# Patient Record
Sex: FEMALE | Race: WHITE | NOT HISPANIC OR LATINO | Employment: FULL TIME | URBAN - METROPOLITAN AREA
[De-identification: names, ages, dates, MRNs, and addresses within clinical notes are randomized per-mention and may not be internally consistent; named-entity substitution may affect disease eponyms.]

---

## 2017-10-07 ENCOUNTER — HOSPITAL ENCOUNTER (EMERGENCY)
Facility: HOSPITAL | Age: 32
Discharge: HOME | End: 2017-10-08
Attending: EMERGENCY MEDICINE | Admitting: EMERGENCY MEDICINE

## 2017-10-07 VITALS
HEART RATE: 98 BPM | SYSTOLIC BLOOD PRESSURE: 137 MMHG | OXYGEN SATURATION: 98 % | HEIGHT: 68 IN | TEMPERATURE: 98.1 F | BODY MASS INDEX: 36.37 KG/M2 | RESPIRATION RATE: 20 BRPM | WEIGHT: 240 LBS | DIASTOLIC BLOOD PRESSURE: 78 MMHG

## 2017-10-07 DIAGNOSIS — A57: Primary | ICD-10-CM

## 2017-10-07 LAB
B-HCG UR QL: NEGATIVE
INTERNAL NEGATIVE CONTROL: NEGATIVE
INTERNAL POSITIVE CONTROL: POSITIVE
Lab: NORMAL

## 2017-10-07 PROCEDURE — 96372 THER/PROPH/DIAG INJ SC/IM: CPT

## 2017-10-07 PROCEDURE — 81001 URINALYSIS AUTO W/SCOPE: CPT | Performed by: NURSE PRACTITIONER

## 2017-10-07 PROCEDURE — 99283 EMERGENCY DEPT VISIT LOW MDM: CPT

## 2017-10-07 PROCEDURE — 87086 URINE CULTURE/COLONY COUNT: CPT | Performed by: NURSE PRACTITIONER

## 2017-10-08 LAB
BACTERIA UR QL AUTO: ABNORMAL /HPF
BILIRUB UR QL STRIP: NEGATIVE
CLARITY UR: CLEAR
CLUE CELLS SPEC QL WET PREP: NORMAL
COLOR UR: YELLOW
GLUCOSE UR STRIP-MCNC: NEGATIVE MG/DL
HGB UR QL STRIP.AUTO: ABNORMAL
HYALINE CASTS UR QL AUTO: ABNORMAL /LPF
HYDATID CYST SPEC WET PREP: NORMAL
KETONES UR QL STRIP: NEGATIVE
KOH PREP NAIL: NORMAL
LEUKOCYTE ESTERASE UR QL STRIP.AUTO: ABNORMAL
NITRITE UR QL STRIP: NEGATIVE
PH UR STRIP.AUTO: 6 [PH] (ref 5–8)
PROT UR QL STRIP: NEGATIVE
RBC # UR: ABNORMAL /HPF
REF LAB TEST METHOD: ABNORMAL
SP GR UR STRIP: 1.01 (ref 1–1.03)
SQUAMOUS #/AREA URNS HPF: ABNORMAL /HPF
T VAGINALIS SPEC QL WET PREP: NORMAL
UROBILINOGEN UR QL STRIP: ABNORMAL
WBC SPEC QL WET PREP: NORMAL
WBC UR QL AUTO: ABNORMAL /HPF
YEAST GENITAL QL WET PREP: NORMAL
YEAST URNS QL MICRO: ABNORMAL /HPF

## 2017-10-08 PROCEDURE — 25010000002 CEFTRIAXONE PER 250 MG: Performed by: NURSE PRACTITIONER

## 2017-10-08 PROCEDURE — 87210 SMEAR WET MOUNT SALINE/INK: CPT | Performed by: NURSE PRACTITIONER

## 2017-10-08 PROCEDURE — 87491 CHLMYD TRACH DNA AMP PROBE: CPT | Performed by: NURSE PRACTITIONER

## 2017-10-08 PROCEDURE — 87220 TISSUE EXAM FOR FUNGI: CPT | Performed by: NURSE PRACTITIONER

## 2017-10-08 PROCEDURE — 96372 THER/PROPH/DIAG INJ SC/IM: CPT

## 2017-10-08 PROCEDURE — 87255 GENET VIRUS ISOLATE HSV: CPT | Performed by: NURSE PRACTITIONER

## 2017-10-08 PROCEDURE — 87591 N.GONORRHOEAE DNA AMP PROB: CPT | Performed by: NURSE PRACTITIONER

## 2017-10-08 RX ORDER — AZITHROMYCIN 250 MG/1
1000 TABLET, FILM COATED ORAL ONCE
Status: COMPLETED | OUTPATIENT
Start: 2017-10-08 | End: 2017-10-08

## 2017-10-08 RX ORDER — CEFTRIAXONE SODIUM 250 MG/1
250 INJECTION, POWDER, FOR SOLUTION INTRAMUSCULAR; INTRAVENOUS ONCE
Status: COMPLETED | OUTPATIENT
Start: 2017-10-08 | End: 2017-10-08

## 2017-10-08 RX ORDER — LIDOCAINE HYDROCHLORIDE 10 MG/ML
0.9 INJECTION, SOLUTION EPIDURAL; INFILTRATION; INTRACAUDAL; PERINEURAL ONCE
Status: COMPLETED | OUTPATIENT
Start: 2017-10-08 | End: 2017-10-08

## 2017-10-08 RX ORDER — CEPHALEXIN 500 MG/1
500 CAPSULE ORAL 2 TIMES DAILY
Qty: 20 CAPSULE | Refills: 0 | OUTPATIENT
Start: 2017-10-08 | End: 2021-04-25

## 2017-10-08 RX ADMIN — CEFTRIAXONE SODIUM 250 MG: 250 INJECTION, POWDER, FOR SOLUTION INTRAMUSCULAR; INTRAVENOUS at 00:12

## 2017-10-08 RX ADMIN — AZITHROMYCIN 1000 MG: 250 TABLET, FILM COATED ORAL at 00:10

## 2017-10-08 RX ADMIN — LIDOCAINE HYDROCHLORIDE 0.9 ML: 10 INJECTION, SOLUTION EPIDURAL; INFILTRATION; INTRACAUDAL; PERINEURAL at 00:12

## 2017-10-08 NOTE — ED PROVIDER NOTES
Subjective   HPI Comments: Yasmeen Kang is a 32 y.o.female who presents to the ED with c/o vaginal pain. She reports that about a week ago, she developed what looked like a sore on her vagina that has been producing odorous discharge. She has tried applying Bactrim cream without improvement. She also notes severe dysuria and is concerned that she may have an STD. She has been sexually active with the same partner for the last year and a half. Other than some abdominal cramping, she has no other acute complaints at this time. Her last normal menstrual period ended on 10/04/17.      Patient is a 32 y.o. female presenting with general illness.   History provided by:  Patient  Illness   Location:  Vagina  Quality:  Sore poducing odorous discharge  Severity:  Moderate  Onset quality:  Gradual  Duration:  1 week  Timing:  Constant  Progression:  Worsening  Chronicity:  New  Context:   She reports that about a week ago, she developed what looked like a sore on her vagina that has been producing odorous discharge. She has tried applying Bactrim cream without improvement.  Relieved by:  Nothing  Worsened by:  Nothing  Ineffective treatments:  Topical ointment (Bactrim)  Associated symptoms: abdominal pain (cramping)    Associated symptoms: no chest pain, no congestion, no cough, no diarrhea, no fever, no headaches, no nausea, no rhinorrhea, no shortness of breath, no sore throat and no vomiting    Abdominal pain:     Location:  Generalized    Quality: cramping      Severity:  Moderate    Onset quality:  Gradual    Duration:  1 week    Timing:  Intermittent    Progression:  Unchanged    Chronicity:  New      Review of Systems   Constitutional: Negative for chills and fever.   HENT: Negative for congestion, rhinorrhea, sore throat and trouble swallowing.    Respiratory: Negative for cough and shortness of breath.    Cardiovascular: Negative for chest pain.   Gastrointestinal: Positive for abdominal pain (cramping). Negative  for diarrhea, nausea and vomiting.   Genitourinary: Positive for dysuria, vaginal discharge and vaginal pain. Negative for vaginal bleeding.   Musculoskeletal: Negative for back pain and neck pain.   Neurological: Negative for dizziness, weakness and headaches.   All other systems reviewed and are negative.      History reviewed. No pertinent past medical history.    No Known Allergies    Past Surgical History:   Procedure Laterality Date   •  SECTION      x5   • TUBAL ABDOMINAL LIGATION         History reviewed. No pertinent family history.    Social History     Social History   • Marital status: Legally      Spouse name: N/A   • Number of children: N/A   • Years of education: N/A     Social History Main Topics   • Smoking status: Never Smoker   • Smokeless tobacco: None   • Alcohol use Yes   • Drug use: No   • Sexual activity: Not Asked     Other Topics Concern   • None     Social History Narrative   • None         Objective   Physical Exam   Constitutional: She is oriented to person, place, and time. She appears well-developed and well-nourished.   Anxious     HENT:   Head: Normocephalic and atraumatic.   Eyes: EOM are normal.   Neck: Normal range of motion.   Cardiovascular: Normal rate and regular rhythm.    Pulmonary/Chest: Effort normal. No respiratory distress.   Abdominal: Soft. Bowel sounds are normal. She exhibits no distension.   Genitourinary: Pelvic exam was performed with patient prone. There is tenderness and lesion (2 cm lesion, irregular margins, lesion is tenderness to palpation.) on the left labia. Cervix exhibits no motion tenderness. Right adnexum displays no mass and no tenderness. Left adnexum displays no mass and no tenderness.   Genitourinary Comments: Chaperone at bedside.    Musculoskeletal: Normal range of motion. She exhibits no edema.   Neurological: She is alert and oriented to person, place, and time.   Skin: Skin is warm and dry.   Psychiatric: Her mood appears  anxious.   Nursing note and vitals reviewed.      Procedures         ED Course  ED Course   Comment By Time   0100  patient will be discharged home.  Patient will be given information on STDs.  Patient advises that her partner needs to be treated.  Patient advises that the results of her cultures will return in 3 days.  Patient agrees and verbalizes understanding. Kylee Choi, APRN 10/08 0440     Recent Results (from the past 24 hour(s))   Urinalysis With / Culture If Indicated - Urine, Clean Catch    Collection Time: 10/07/17 11:40 PM   Result Value Ref Range    Color, UA Yellow Yellow, Straw    Appearance, UA Clear Clear    pH, UA 6.0 5.0 - 8.0    Specific Gravity, UA 1.007 1.001 - 1.030    Glucose, UA Negative Negative    Ketones, UA Negative Negative    Bilirubin, UA Negative Negative    Blood, UA Trace (A) Negative    Protein, UA Negative Negative    Leuk Esterase, UA Moderate (2+) (A) Negative    Nitrite, UA Negative Negative    Urobilinogen, UA 0.2 E.U./dL 0.2 - 1.0 E.U./dL   Urinalysis, Microscopic Only - Urine, Clean Catch    Collection Time: 10/07/17 11:40 PM   Result Value Ref Range    RBC, UA 0-2 None Seen, 0-2 /HPF    WBC, UA 6-12 (A) None Seen /HPF    Bacteria, UA None Seen None Seen, Trace /HPF    Squamous Epithelial Cells, UA 0-2 None Seen, 0-2 /HPF    Yeast, UA Small/1+ Budding Yeast None Seen /HPF    Hyaline Casts, UA None Seen 0 - 6 /LPF    Methodology Automated Microscopy    POCT Pregnancy, urine    Collection Time: 10/07/17 11:45 PM   Result Value Ref Range    HCG, Urine, QL Negative Negative    Lot Number [id number]     Internal Positive Control Positive     Internal Negative Control Negative    KOH Prep - Swab, Cervix    Collection Time: 10/08/17 12:02 AM   Result Value Ref Range    KOH Prep No yeast or hyphal elements seen No yeast or hyphal elements seen   Wet Prep, Genital - Swab, Vagina    Collection Time: 10/08/17 12:02 AM   Result Value Ref Range    YEAST No yeast seen No yeast  "seen    HYPHAL ELEMENTS No Hyphal elements seen No Hyphal elements seen    WBC'S No WBC's seen No WBC's seen    Clue Cells, Wet Prep No Clue cells seen No Clue cells seen    Trichomonas, Wet Prep No Trichomonas seen No Trichomonas seen     Note: In addition to lab results from this visit, the labs listed above may include labs taken at another facility or during a different encounter within the last 24 hours. Please correlate lab times with ED admission and discharge times for further clarification of the services performed during this visit.    No orders to display     Vitals:    10/07/17 2229 10/07/17 2248   BP: 137/78    Pulse: 98    Resp: 20    Temp:  98.1 °F (36.7 °C)   TempSrc:  Oral   SpO2: 98%    Weight: 240 lb (109 kg)    Height: 68\" (172.7 cm)      Medications   azithromycin (ZITHROMAX) tablet 1,000 mg (1,000 mg Oral Given 10/8/17 0010)   cefTRIAXone (ROCEPHIN) injection 250 mg (250 mg Intramuscular Given 10/8/17 0012)   lidocaine PF 1% (XYLOCAINE) injection 0.9 mL (0.9 mL Injection Given 10/8/17 0012)     ECG/EMG Results (last 24 hours)     ** No results found for the last 24 hours. **                       MDM    Final diagnoses:   Chancroid in female       Documentation assistance provided by symone Brown.  Information recorded by the symone was done at my direction and has been verified and validated by me.     Ana Rosa Brown  10/07/17 3224       MARYANN Feng  10/08/17 6070    "

## 2017-10-10 LAB — BACTERIA SPEC AEROBE CULT: NORMAL

## 2017-10-11 LAB
C TRACH RRNA SPEC DONR QL NAA+PROBE: NEGATIVE
N GONORRHOEA DNA SPEC QL NAA+PROBE: NEGATIVE

## 2017-10-12 LAB — HSV SPEC CULT: NEGATIVE
